# Patient Record
Sex: FEMALE | Race: WHITE | Employment: FULL TIME | URBAN - NONMETROPOLITAN AREA
[De-identification: names, ages, dates, MRNs, and addresses within clinical notes are randomized per-mention and may not be internally consistent; named-entity substitution may affect disease eponyms.]

---

## 2017-10-02 ENCOUNTER — HOSPITAL ENCOUNTER (EMERGENCY)
Age: 52
Discharge: HOME | End: 2017-10-02
Payer: COMMERCIAL

## 2017-10-02 VITALS
DIASTOLIC BLOOD PRESSURE: 75 MMHG | OXYGEN SATURATION: 100 % | RESPIRATION RATE: 16 BRPM | SYSTOLIC BLOOD PRESSURE: 147 MMHG | HEART RATE: 95 BPM | WEIGHT: 138 LBS | BODY MASS INDEX: 19.8 KG/M2 | TEMPERATURE: 98.8 F

## 2017-10-02 DIAGNOSIS — H65.01 RIGHT ACUTE SEROUS OTITIS MEDIA, RECURRENCE NOT SPECIFIED: Primary | ICD-10-CM

## 2017-10-02 DIAGNOSIS — J20.9 ACUTE BRONCHITIS, UNSPECIFIED ORGANISM: ICD-10-CM

## 2017-10-02 DIAGNOSIS — H61.22 IMPACTED CERUMEN OF LEFT EAR: ICD-10-CM

## 2017-10-02 PROCEDURE — 99213 OFFICE O/P EST LOW 20 MIN: CPT | Performed by: NURSE PRACTITIONER

## 2017-10-02 PROCEDURE — 99212 OFFICE O/P EST SF 10 MIN: CPT

## 2017-10-02 RX ORDER — ALBUTEROL SULFATE 90 UG/1
2 AEROSOL, METERED RESPIRATORY (INHALATION) 2 TIMES DAILY
Qty: 1 INHALER | Refills: 0 | Status: SHIPPED | OUTPATIENT
Start: 2017-10-02 | End: 2018-05-04 | Stop reason: ALTCHOICE

## 2017-10-02 RX ORDER — CEFDINIR 300 MG/1
300 CAPSULE ORAL 2 TIMES DAILY
Qty: 20 CAPSULE | Refills: 0 | Status: SHIPPED | OUTPATIENT
Start: 2017-10-02 | End: 2017-10-12

## 2017-10-02 RX ORDER — BIOTIN 10000 MCG
1 CAPSULE ORAL DAILY
COMMUNITY
End: 2019-01-15 | Stop reason: DRUGHIGH

## 2017-10-02 RX ORDER — METHYLPREDNISOLONE 4 MG/1
TABLET ORAL
Qty: 1 KIT | Refills: 0 | Status: SHIPPED | OUTPATIENT
Start: 2017-10-02 | End: 2017-10-08

## 2017-10-02 RX ORDER — BROMPHENIRAMINE MALEATE, PSEUDOEPHEDRINE HYDROCHLORIDE, AND DEXTROMETHORPHAN HYDROBROMIDE 2; 30; 10 MG/5ML; MG/5ML; MG/5ML
5 SYRUP ORAL 3 TIMES DAILY PRN
Qty: 60 ML | Refills: 0 | Status: SHIPPED | OUTPATIENT
Start: 2017-10-02 | End: 2017-10-07

## 2017-10-02 RX ORDER — OMEGA-3/DHA/EPA/FISH OIL 1000 MG
1 CAPSULE ORAL DAILY
Qty: 30 CAPSULE | Refills: 0 | Status: SHIPPED | OUTPATIENT
Start: 2017-10-02 | End: 2017-11-01

## 2017-10-02 ASSESSMENT — ENCOUNTER SYMPTOMS
VOICE CHANGE: 1
RHINORRHEA: 1
ABDOMINAL DISTENTION: 0
SORE THROAT: 0
CONSTIPATION: 0
DIARRHEA: 1
WHEEZING: 0
ABDOMINAL PAIN: 0
SHORTNESS OF BREATH: 0
VOMITING: 0
APNEA: 0
FACIAL SWELLING: 0
SINUS PRESSURE: 1
TROUBLE SWALLOWING: 0
CHOKING: 0
NAUSEA: 0
ANAL BLEEDING: 0
HOARSE VOICE: 0
CHEST TIGHTNESS: 0
BLOOD IN STOOL: 0
SWOLLEN GLANDS: 0
SNORING: 0
RECTAL PAIN: 0
COUGH: 1

## 2017-10-02 NOTE — ED AVS SNAPSHOT
After Visit Summary  (Discharge Instructions)    Medication List for Home    Based on the information you provided to us as well as any changes during this visit, the following is your updated medication list.  Compare this with your prescription bottles at home. If you have any questions or concerns, contact your primary care physician's office. Daily Medication List (This medication list can be shared with any Healthcare provider who is helping you manage your medications)      There are NEW medications for you. START taking them after you leave the hospital     albuterol sulfate  (90 Base) MCG/ACT inhaler   Inhale 2 puffs into the lungs 2 times daily for 5 days       brompheniramine-pseudoephedrine-DM 30-2-10 MG/5ML syrup   Take 5 mLs by mouth 3 times daily as needed for Congestion or Cough       cefdinir 300 MG capsule   Commonly known as:  OMNICEF   Take 1 capsule by mouth 2 times daily for 10 days       methylPREDNISolone 4 MG tablet   Commonly known as:  MEDROL (DORI)   Take by mouth.        PROBIOTIC ACIDOPHILUS BIOBEADS Caps   Take 1 capsule by mouth daily         These are medications you told us you were taking at home, CONTINUE taking them after you leave the hospital     aspirin 81 MG tablet   Take 81 mg by mouth daily       B COMPLEX-B12 PO   Take by mouth       calcium carbonate 500 MG Tabs tablet   Commonly known as:  OSCAL   Take 500 mg by mouth daily       ibuprofen 200 MG tablet   Commonly known as:  ADVIL;MOTRIN   Take 400 mg by mouth every 6 hours as needed for Pain       therapeutic multivitamin-minerals tablet   Take 1 tablet by mouth daily       vitamin C 500 MG tablet   Take 500 mg by mouth daily         ASK your doctor about these medications if you have questions     Biotin 10 MG Caps   Take 1 tablet by mouth daily       PROBIOTIC ACIDOPHILUS PO   Take 1 capsule by mouth daily            Where to Get Your Medications These medications were sent to [facility], [address] [facility]  [address] [facility]     Phone:  [phone]     albuterol sulfate  (90 Base) MCG/ACT inhaler    brompheniramine-pseudoephedrine-DM 30-2-10 MG/5ML syrup    cefdinir 300 MG capsule    methylPREDNISolone 4 MG tablet    PROBIOTIC ACIDOPHILUS BIOBEADS Caps               Allergies as of 10/2/2017        Reactions    Darvon [Propoxyphene] Itching      Immunizations as of 10/2/2017     Name Date Dose VIS Date Route    Influenza, Quadv, 3 yrs and older, IM, Preservative Free 10/3/2016 -- -- --    External: Patient reported         After Visit Summary    This summary was created for you. Thank you for entrusting your care to us. The following information includes details about your hospital/visit stay along with steps you should take to help with your recovery once you leave the hospital.  In this packet, you will find information about the topics listed below:    · Instructions about your medications including a list of your home medications  · A summary of your hospital visit  · Follow-up appointments once you have left the hospital  · Your care plan at home      You may receive a survey regarding the care you received during your stay. Your input is valuable to us. We encourage you to complete and return your survey in the envelope provided. We hope you will choose us in the future for your healthcare needs. Patient Information     Patient Name KELVIN Alberto 1965      Care Provided at:     Name Address Phone       [address] [phone]            Your Visit    Here you will find information about your visit, including the reason for your visit.   Please take this sheet with you when you visit your doctor or guidelines. However these guidelines can be individualized by your provider. 1/21/2018    Pap Smear 1/26/2019    Colonoscopy 9/27/2023                 Care Plan Once You Return Home    This section includes instructions you will need to follow once you leave the hospital.  Your care team will discuss these with you, so you and those caring for you know how to best care for your health needs at home. This section may also include educational information about certain health topics that may be of help to you. Important Information if you smoke or are exposed to smoking       SMOKING: QUIT SMOKING. THIS IS THE MOST IMPORTANT ACTION YOU CAN TAKE TO IMPROVE YOUR CURRENT AND FUTURE HEALTH. Call the [facility] [place] NOW ([phone])    Smoking harms nonsmokers. When nonsmokers are around people who smoke, they absorb nicotine, carbon monoxide, and other ingredients of tobacco smoke. DO NOT SMOKE AROUND CHILDREN     Children exposed to secondhand smoke are at an increased risk of:  Sudden Infant Death Syndrome (SIDS), acute respiratory infections, inflammation of the middle ear, and severe asthma. Over a longer time, it causes heart disease and lung cancer. There is no safe level of exposure to secondhand smoke. [employer] Signup     [employer] allows you to send messages to your doctor, view your test results, renew your prescriptions, schedule appointments, view visit notes, and more. How Do I Sign Up? 1. In your Internet browser, go to [email or web address]  2. Click on the Sign Up Now link in the Sign In box. You will see the New Member Sign Up page. 3. Enter your [employer] Access Code exactly as it appears below. You will not need to use this code after youve completed the sign-up process. If you do not sign up before the expiration date, you must request a new code.   [employer] Access Code: [email or web address]. Enter A923 in the [facility] box to learn more about \"Middle Ear Fluid: Care Instructions. \"     If you do not have an account, please click on the \"Sign Up Now\" link. Current as of: July 29, 2016  Content Version: 11.3  © 8546-2550 [employer]. Care instructions adapted under license by [facility] ([facility]). If you have questions about a medical condition or this instruction, always ask your healthcare professional. [address] any warranty or liability for your use of this information. More Information           Saline Nasal Washes: Care Instructions  Your Care Instructions  Saline nasal washes help keep the nasal passages open by washing out thick or dried mucus. This simple remedy can help relieve symptoms of allergies, sinusitis, and colds. It also can make the nose feel more comfortable by keeping the mucous membranes moist. You may notice a little burning sensation in your nose the first few times you use the solution, but this usually gets better in a few days. Follow-up care is a key part of your treatment and safety. Be sure to make and go to all appointments, and call your doctor if you are having problems. It's also a good idea to know your test results and keep a list of the medicines you take. How can you care for yourself at home? · You can buy premixed saline solution in a squeeze bottle or other sinus rinse products at a drugstore. Read and follow the instructions on the label. · You also can make your own saline solution by adding 1 teaspoon of salt and 1 teaspoon of baking soda to 2 cups of distilled water. · If you use a homemade solution, pour a small amount into a clean bowl. Using a rubber bulb syringe, squeeze the syringe and place the tip in the salt water. Pull a small amount of the salt water into the syringe by relaxing your hand. · Sit down with your head tilted slightly back. Do not lie down.  Put the

## 2017-10-02 NOTE — ED NOTES
Discharge instructions reviewed with patient. Patient informed to go to ER for worsening symptoms, SOB, chest pain or abdominal pain and follow up with PCP. Patient ambulatory out in stable condition.       Augustine Haywood RN  10/02/17 2791

## 2017-10-02 NOTE — ED PROVIDER NOTES
DANIE Urbano MYESHA Jacobsen 89  Urgent Care Encounter      CHIEF COMPLAINT       Chief Complaint   Patient presents with    Cough    Head Congestion     with some nasal congestion       Nurses Notes reviewed and I agree except as noted in the HPI. HISTORY OF PRESENT ILLNESS   Ozzie Walsh is a 45 y.o. Patient is a 45 y.o. female presenting with sinusitis. The history is provided by the patient. Sinusitis   Pain details:     Location:  Frontal and maxillary    Quality:  Pressure    Severity:  Moderate    Duration:  3 days    Timing:  Constant  Progression:  Worsening  Chronicity:  Recurrent  Context: allergies    Context: not chemical odor, not deviated nasal septum, not recent URI and not smoke inhalation    Relieved by:  Nothing  Worsened by:  Lying down  Ineffective treatments:  Oral decongestants and spray decongestants  Associated symptoms: chest pain, chills, congestion, cough, fatigue, headaches, rhinorrhea and sneezing    Associated symptoms: no ear pain, no fever, no hoarse voice, no mouth breathing, no nausea, no shortness of breath, no snoring, no sore throat, no swollen glands, no tooth pain, no vertigo, no vomiting and no wheezing    Risk factors: no allergic reaction, no asthma, no BiPAP, no COPD, no CPAP use, no cystic fibrosis, no diabetes, no immune deficiency, no nasal cannula, no nasal polyps and no smoke exposure        REVIEW OF SYSTEMS     Review of Systems   Constitutional: Positive for appetite change, chills and fatigue. Negative for diaphoresis and fever. HENT: Positive for congestion, postnasal drip, rhinorrhea, sinus pressure, sneezing and voice change. Negative for ear pain, facial swelling, hearing loss, hoarse voice, mouth sores, nosebleeds, sore throat, tinnitus and trouble swallowing. Respiratory: Positive for cough. Negative for apnea, snoring, choking, chest tightness, shortness of breath and wheezing. Cardiovascular: Positive for chest pain.  Negative for Head: Normocephalic and atraumatic. Right Ear: There is swelling and tenderness. Tympanic membrane is erythematous. Left Ear: A foreign body (cerumen obstructs TM) is present. Nose: Mucosal edema and rhinorrhea present. Right sinus exhibits maxillary sinus tenderness and frontal sinus tenderness. Left sinus exhibits maxillary sinus tenderness and frontal sinus tenderness. Mouth/Throat: Uvula is midline and mucous membranes are normal. Posterior oropharyngeal edema and posterior oropharyngeal erythema present. Pulmonary/Chest: She has decreased breath sounds in the right lower field and the left lower field. She has wheezes in the left lower field. She has no rhonchi. She has no rales. Neurological: She is alert. Skin: She is not diaphoretic. Nursing note and vitals reviewed. DIAGNOSTIC RESULTS   Labs:No results found for this visit on 10/02/17. IMAGING:  No orders to display     URGENT CARE COURSE:     Vitals:    10/02/17 0902   BP: (!) 147/75   Pulse: 95   Resp: 16   Temp: 98.8 °F (37.1 °C)   TempSrc: Oral   SpO2: 100%   Weight: 138 lb (62.6 kg)       Medications - No data to display  PROCEDURES:  None  FINAL IMPRESSION      1. Right acute serous otitis media, recurrence not specified    2. Impacted cerumen of left ear    3. Acute bronchitis, unspecified organism        DISPOSITION/PLAN   Decision To Discharge      The parent or patient representative was advised that at this point the patient can be treated safely at home, the parent or Patient representative should be aware of following interventions and  advised to the watch for the following:  #1. Any increasing pain not controlled with Motrin or Tylenol. #2. Any development of drainage from the ears redness of the auricle or posterior ear.   #3.  Her development of the any fever chills, headache or stiffness of the neck the patient needs to be reevaluated by the primary care provider, return here or go to the emergency department

## 2018-04-17 ENCOUNTER — HOSPITAL ENCOUNTER (EMERGENCY)
Age: 53
Discharge: HOME | End: 2018-04-17
Payer: COMMERCIAL

## 2018-04-17 VITALS
SYSTOLIC BLOOD PRESSURE: 146 MMHG | DIASTOLIC BLOOD PRESSURE: 79 MMHG | RESPIRATION RATE: 16 BRPM | OXYGEN SATURATION: 98 % | TEMPERATURE: 98 F | BODY MASS INDEX: 21.24 KG/M2 | WEIGHT: 148 LBS | HEART RATE: 76 BPM

## 2018-04-17 DIAGNOSIS — J01.00 ACUTE NON-RECURRENT MAXILLARY SINUSITIS: Primary | ICD-10-CM

## 2018-04-17 PROCEDURE — 99214 OFFICE O/P EST MOD 30 MIN: CPT | Performed by: NURSE PRACTITIONER

## 2018-04-17 PROCEDURE — 99213 OFFICE O/P EST LOW 20 MIN: CPT

## 2018-04-17 RX ORDER — AMOXICILLIN AND CLAVULANATE POTASSIUM 875; 125 MG/1; MG/1
1 TABLET, FILM COATED ORAL 2 TIMES DAILY
Qty: 14 TABLET | Refills: 0 | Status: SHIPPED | OUTPATIENT
Start: 2018-04-17 | End: 2018-04-24

## 2018-04-17 RX ORDER — LORATADINE AND PSEUDOEPHEDRINE 10; 240 MG/1; MG/1
1 TABLET, EXTENDED RELEASE ORAL DAILY
Refills: 0 | COMMUNITY
Start: 2018-04-17

## 2018-04-17 ASSESSMENT — PAIN DESCRIPTION - LOCATION: LOCATION: CHEST;BACK

## 2018-04-17 ASSESSMENT — ENCOUNTER SYMPTOMS
HOARSE VOICE: 0
NAUSEA: 0
SWOLLEN GLANDS: 0
RHINORRHEA: 0
TROUBLE SWALLOWING: 0
STRIDOR: 0
SNORING: 0
CHEST TIGHTNESS: 0
SHORTNESS OF BREATH: 0
WHEEZING: 0
VOICE CHANGE: 0
SORE THROAT: 0
COUGH: 1
FACIAL SWELLING: 0
SINUS PRESSURE: 1
VOMITING: 0

## 2018-04-17 ASSESSMENT — PAIN DESCRIPTION - PAIN TYPE: TYPE: ACUTE PAIN

## 2018-04-17 ASSESSMENT — ACTIVITIES OF DAILY LIVING (ADL): EFFECT OF PAIN ON DAILY ACTIVITIES: WITH COUGHING

## 2018-04-17 ASSESSMENT — PAIN DESCRIPTION - FREQUENCY: FREQUENCY: INTERMITTENT

## 2018-04-17 ASSESSMENT — PAIN DESCRIPTION - DESCRIPTORS: DESCRIPTORS: ACHING

## 2018-04-17 ASSESSMENT — PAIN SCALES - GENERAL: PAINLEVEL_OUTOF10: 6

## 2018-05-04 ENCOUNTER — HOSPITAL ENCOUNTER (EMERGENCY)
Age: 53
Discharge: HOME | End: 2018-05-04
Payer: COMMERCIAL

## 2018-05-04 VITALS
HEART RATE: 79 BPM | TEMPERATURE: 98.7 F | RESPIRATION RATE: 18 BRPM | DIASTOLIC BLOOD PRESSURE: 78 MMHG | BODY MASS INDEX: 20.92 KG/M2 | OXYGEN SATURATION: 97 % | WEIGHT: 145.8 LBS | SYSTOLIC BLOOD PRESSURE: 162 MMHG

## 2018-05-04 DIAGNOSIS — J32.9 CHRONIC SINUSITIS, UNSPECIFIED LOCATION: Primary | ICD-10-CM

## 2018-05-04 LAB
GROUP A STREP CULTURE, REFLEX: NEGATIVE
REFLEX THROAT C + S: NORMAL

## 2018-05-04 PROCEDURE — 87070 CULTURE OTHR SPECIMN AEROBIC: CPT

## 2018-05-04 PROCEDURE — 99215 OFFICE O/P EST HI 40 MIN: CPT

## 2018-05-04 PROCEDURE — 99213 OFFICE O/P EST LOW 20 MIN: CPT | Performed by: NURSE PRACTITIONER

## 2018-05-04 RX ORDER — SULFAMETHOXAZOLE AND TRIMETHOPRIM 800; 160 MG/1; MG/1
1 TABLET ORAL 2 TIMES DAILY
Qty: 20 TABLET | Refills: 0 | Status: SHIPPED | OUTPATIENT
Start: 2018-05-04 | End: 2018-05-14

## 2018-05-04 RX ORDER — FLUCONAZOLE 150 MG/1
150 TABLET ORAL ONCE
Qty: 1 TABLET | Refills: 0 | Status: SHIPPED | OUTPATIENT
Start: 2018-05-04 | End: 2018-05-04

## 2018-05-04 ASSESSMENT — ENCOUNTER SYMPTOMS
COUGH: 1
DIARRHEA: 0
SINUS PAIN: 1
SHORTNESS OF BREATH: 0
SORE THROAT: 1
CHEST TIGHTNESS: 0
VOMITING: 0
ABDOMINAL PAIN: 0
SINUS PRESSURE: 1
RHINORRHEA: 0
NAUSEA: 0

## 2018-05-04 ASSESSMENT — PAIN DESCRIPTION - DESCRIPTORS: DESCRIPTORS: ACHING

## 2018-05-04 ASSESSMENT — PAIN SCALES - GENERAL: PAINLEVEL_OUTOF10: 7

## 2018-05-04 ASSESSMENT — PAIN DESCRIPTION - PAIN TYPE: TYPE: ACUTE PAIN

## 2018-05-04 ASSESSMENT — PAIN DESCRIPTION - LOCATION: LOCATION: THROAT

## 2018-05-06 LAB — THROAT/NOSE CULTURE: NORMAL

## 2018-06-22 ENCOUNTER — HOSPITAL ENCOUNTER (EMERGENCY)
Age: 53
Discharge: HOME | End: 2018-06-22
Payer: COMMERCIAL

## 2018-06-22 VITALS
TEMPERATURE: 97.8 F | BODY MASS INDEX: 20.9 KG/M2 | HEART RATE: 86 BPM | OXYGEN SATURATION: 100 % | RESPIRATION RATE: 16 BRPM | DIASTOLIC BLOOD PRESSURE: 83 MMHG | WEIGHT: 146 LBS | HEIGHT: 70 IN | SYSTOLIC BLOOD PRESSURE: 150 MMHG

## 2018-06-22 DIAGNOSIS — N30.01 ACUTE CYSTITIS WITH HEMATURIA: Primary | ICD-10-CM

## 2018-06-22 LAB
BILIRUBIN URINE: NEGATIVE
BLOOD, URINE: ABNORMAL
CHARACTER, URINE: CLEAR
COLOR: YELLOW
GLUCOSE, URINE: NEGATIVE MG/DL
KETONES, URINE: NEGATIVE
LEUKOCYTES, UA: ABNORMAL
NITRATE, UA: NEGATIVE
PH UA: 7.5 (ref 5–9)
PROTEIN UA: NEGATIVE MG/DL
REFLEX TO URINE C & S: ABNORMAL
SPECIFIC GRAVITY UA: 1.01 (ref 1–1.03)
UROBILINOGEN, URINE: 0.2 EU/DL (ref 0–1)

## 2018-06-22 PROCEDURE — 87086 URINE CULTURE/COLONY COUNT: CPT

## 2018-06-22 PROCEDURE — 99213 OFFICE O/P EST LOW 20 MIN: CPT | Performed by: NURSE PRACTITIONER

## 2018-06-22 PROCEDURE — 81003 URINALYSIS AUTO W/O SCOPE: CPT

## 2018-06-22 PROCEDURE — 99213 OFFICE O/P EST LOW 20 MIN: CPT

## 2018-06-22 RX ORDER — NITROFURANTOIN 25; 75 MG/1; MG/1
100 CAPSULE ORAL 2 TIMES DAILY
Qty: 10 CAPSULE | Refills: 0 | Status: SHIPPED | OUTPATIENT
Start: 2018-06-22 | End: 2018-06-27

## 2018-06-22 RX ORDER — PHENAZOPYRIDINE HYDROCHLORIDE 200 MG/1
200 TABLET, FILM COATED ORAL 3 TIMES DAILY PRN
Qty: 9 TABLET | Refills: 0 | Status: SHIPPED | OUTPATIENT
Start: 2018-06-22 | End: 2018-06-25

## 2018-06-22 ASSESSMENT — ENCOUNTER SYMPTOMS
DIARRHEA: 0
ABDOMINAL PAIN: 0
NAUSEA: 0
SHORTNESS OF BREATH: 0
COUGH: 0
WHEEZING: 0
CONSTIPATION: 0
SORE THROAT: 0

## 2018-06-22 ASSESSMENT — PAIN SCALES - GENERAL: PAINLEVEL_OUTOF10: 8

## 2018-06-22 ASSESSMENT — PAIN DESCRIPTION - ORIENTATION: ORIENTATION: LOWER

## 2018-06-22 ASSESSMENT — PAIN DESCRIPTION - DESCRIPTORS: DESCRIPTORS: ACHING

## 2018-06-22 ASSESSMENT — PAIN DESCRIPTION - PAIN TYPE: TYPE: ACUTE PAIN

## 2018-06-22 ASSESSMENT — PAIN DESCRIPTION - LOCATION: LOCATION: ABDOMEN

## 2018-06-24 LAB
ORGANISM: ABNORMAL
URINE CULTURE REFLEX: ABNORMAL

## 2019-01-10 ENCOUNTER — TELEPHONE (OUTPATIENT)
Dept: FAMILY MEDICINE CLINIC | Age: 54
End: 2019-01-10

## 2019-01-15 ENCOUNTER — OFFICE VISIT (OUTPATIENT)
Dept: FAMILY MEDICINE CLINIC | Age: 54
End: 2019-01-15
Payer: COMMERCIAL

## 2019-01-15 VITALS
TEMPERATURE: 98.3 F | HEIGHT: 70 IN | RESPIRATION RATE: 16 BRPM | DIASTOLIC BLOOD PRESSURE: 84 MMHG | HEART RATE: 95 BPM | WEIGHT: 148 LBS | BODY MASS INDEX: 21.19 KG/M2 | SYSTOLIC BLOOD PRESSURE: 138 MMHG

## 2019-01-15 DIAGNOSIS — Z00.00 WELL ADULT HEALTH CHECK: Primary | ICD-10-CM

## 2019-01-15 PROCEDURE — G8484 FLU IMMUNIZE NO ADMIN: HCPCS | Performed by: NURSE PRACTITIONER

## 2019-01-15 PROCEDURE — 99386 PREV VISIT NEW AGE 40-64: CPT | Performed by: NURSE PRACTITIONER

## 2019-01-15 RX ORDER — GLUCOSAM/CHON-MSM1/C/MANG/BOSW 750-644 MG
TABLET ORAL
COMMUNITY

## 2019-01-15 RX ORDER — BIOTIN 10000 MCG
CAPSULE ORAL
COMMUNITY

## 2019-01-15 ASSESSMENT — PATIENT HEALTH QUESTIONNAIRE - PHQ9
2. FEELING DOWN, DEPRESSED OR HOPELESS: 0
SUM OF ALL RESPONSES TO PHQ QUESTIONS 1-9: 0
1. LITTLE INTEREST OR PLEASURE IN DOING THINGS: 0
SUM OF ALL RESPONSES TO PHQ9 QUESTIONS 1 & 2: 0
SUM OF ALL RESPONSES TO PHQ QUESTIONS 1-9: 0

## 2019-02-13 ENCOUNTER — TELEPHONE (OUTPATIENT)
Dept: FAMILY MEDICINE CLINIC | Age: 54
End: 2019-02-13

## 2019-02-13 LAB
ABSOLUTE BASO #: 0 K/UL (ref 0–0.1)
ABSOLUTE EOS #: 0.1 K/UL (ref 0.1–0.4)
ABSOLUTE LYMPH #: 1.1 K/UL (ref 0.8–5.2)
ABSOLUTE MONO #: 0.4 K/UL (ref 0.1–0.9)
ABSOLUTE NEUT #: 3 K/UL (ref 1.3–9.1)
ALBUMIN SERPL-MCNC: 4.7 G/DL (ref 3.5–5.2)
ALK PHOSPHATASE: 85 U/L (ref 30–111)
ALT SERPL-CCNC: 17 U/L (ref 5–40)
ANION GAP SERPL CALCULATED.3IONS-SCNC: 10 MEQ/L (ref 10–19)
AST SERPL-CCNC: 21 U/L (ref 9–40)
BASOPHILS RELATIVE PERCENT: 0.9 %
BILIRUB SERPL-MCNC: 0.5 MG/DL
BUN BLDV-MCNC: 11 MG/DL (ref 8–23)
CALCIUM SERPL-MCNC: 9.7 MG/DL (ref 8.5–10.5)
CHLORIDE BLD-SCNC: 103 MEQ/L (ref 95–107)
CHOLESTEROL/HDL RATIO: 5.2
CHOLESTEROL: 224 MG/DL
CO2: 30 MEQ/L (ref 19–31)
CREAT SERPL-MCNC: 0.8 MG/DL (ref 0.6–1.3)
EGFR AFRICAN AMERICAN: 95.5 ML/MIN/1.73 M2
EGFR IF NONAFRICAN AMERICAN: 82.4 ML/MIN/1.73 M2
EOSINOPHILS RELATIVE PERCENT: 1.5 %
GLUCOSE: 80 MG/DL (ref 70–99)
HCT VFR BLD CALC: 38.7 % (ref 36–48)
HDLC SERPL-MCNC: 42.9 MG/DL
HEMOGLOBIN: 13.5 G/DL (ref 12–16)
LDL CHOLESTEROL CALCULATED: 138 MG/DL
LDL/HDL RATIO: 3.2
LYMPHOCYTE %: 24.3 %
MCH RBC QN AUTO: 30.1 PG (ref 27–34)
MCHC RBC AUTO-ENTMCNC: 34.9 G/DL (ref 31–36)
MCV RBC AUTO: 86.4 FL (ref 80–100)
MONOCYTES # BLD: 8.4 %
NEUTROPHILS RELATIVE PERCENT: 64.7 %
PDW BLD-RTO: 13.2 % (ref 10.8–14.8)
PLATELETS: 252 K/UL (ref 150–450)
POTASSIUM SERPL-SCNC: 4.8 MEQ/L (ref 3.5–5.4)
RBC: 4.48 M/UL (ref 4–5.5)
SODIUM BLD-SCNC: 143 MEQ/L (ref 135–146)
TOTAL PROTEIN: 7.1 G/DL (ref 6.1–8.3)
TRIGL SERPL-MCNC: 217 MG/DL
TSH SERPL DL<=0.05 MIU/L-ACNC: 2.51 UIU/ML (ref 0.4–4.1)
VLDLC SERPL CALC-MCNC: 43 MG/DL
WBC: 4.7 K/UL (ref 3.7–10.8)

## 2019-02-14 LAB
CHOLESTEROL, TOTAL: 224 MG/DL
CHOLESTEROL/HDL RATIO: 5.2
HDLC SERPL-MCNC: 43 MG/DL (ref 35–70)
LDL CHOLESTEROL CALCULATED: 138 MG/DL (ref 0–160)
TRIGL SERPL-MCNC: 217 MG/DL
VLDLC SERPL CALC-MCNC: 43 MG/DL

## 2019-02-28 ENCOUNTER — OFFICE VISIT (OUTPATIENT)
Dept: FAMILY MEDICINE CLINIC | Age: 54
End: 2019-02-28
Payer: COMMERCIAL

## 2019-02-28 VITALS
SYSTOLIC BLOOD PRESSURE: 118 MMHG | DIASTOLIC BLOOD PRESSURE: 78 MMHG | WEIGHT: 149 LBS | RESPIRATION RATE: 12 BRPM | HEIGHT: 70 IN | HEART RATE: 88 BPM | BODY MASS INDEX: 21.33 KG/M2 | TEMPERATURE: 98.5 F

## 2019-02-28 DIAGNOSIS — R07.0 BURNING SENSATION OF THROAT: ICD-10-CM

## 2019-02-28 DIAGNOSIS — K21.9 GASTROESOPHAGEAL REFLUX DISEASE, ESOPHAGITIS PRESENCE NOT SPECIFIED: Primary | ICD-10-CM

## 2019-02-28 PROCEDURE — G8484 FLU IMMUNIZE NO ADMIN: HCPCS | Performed by: NURSE PRACTITIONER

## 2019-02-28 PROCEDURE — 3017F COLORECTAL CA SCREEN DOC REV: CPT | Performed by: NURSE PRACTITIONER

## 2019-02-28 PROCEDURE — G8427 DOCREV CUR MEDS BY ELIG CLIN: HCPCS | Performed by: NURSE PRACTITIONER

## 2019-02-28 PROCEDURE — 99213 OFFICE O/P EST LOW 20 MIN: CPT | Performed by: NURSE PRACTITIONER

## 2019-02-28 PROCEDURE — G8420 CALC BMI NORM PARAMETERS: HCPCS | Performed by: NURSE PRACTITIONER

## 2019-02-28 PROCEDURE — 1036F TOBACCO NON-USER: CPT | Performed by: NURSE PRACTITIONER

## 2019-02-28 RX ORDER — OMEPRAZOLE 20 MG/1
20 CAPSULE, DELAYED RELEASE ORAL
Qty: 30 CAPSULE | Refills: 5 | Status: SHIPPED | OUTPATIENT
Start: 2019-02-28

## 2019-02-28 ASSESSMENT — ENCOUNTER SYMPTOMS
NAUSEA: 0
ABDOMINAL PAIN: 0
ABDOMINAL DISTENTION: 0
VOMITING: 0
BLOOD IN STOOL: 0
RESPIRATORY NEGATIVE: 1
CONSTIPATION: 0
DIARRHEA: 0

## 2019-06-19 ENCOUNTER — OFFICE VISIT (OUTPATIENT)
Dept: FAMILY MEDICINE CLINIC | Age: 54
End: 2019-06-19
Payer: COMMERCIAL

## 2019-06-19 ENCOUNTER — TELEPHONE (OUTPATIENT)
Dept: FAMILY MEDICINE CLINIC | Age: 54
End: 2019-06-19

## 2019-06-19 VITALS
WEIGHT: 140 LBS | TEMPERATURE: 98.4 F | HEIGHT: 70 IN | SYSTOLIC BLOOD PRESSURE: 126 MMHG | DIASTOLIC BLOOD PRESSURE: 82 MMHG | RESPIRATION RATE: 16 BRPM | HEART RATE: 88 BPM | BODY MASS INDEX: 20.04 KG/M2

## 2019-06-19 DIAGNOSIS — Z23 NEED FOR TDAP VACCINATION: ICD-10-CM

## 2019-06-19 DIAGNOSIS — Z02.1 PHYSICAL EXAM, PRE-EMPLOYMENT: Primary | ICD-10-CM

## 2019-06-19 PROCEDURE — 99396 PREV VISIT EST AGE 40-64: CPT | Performed by: NURSE PRACTITIONER

## 2019-06-19 PROCEDURE — 90471 IMMUNIZATION ADMIN: CPT | Performed by: NURSE PRACTITIONER

## 2019-06-19 PROCEDURE — 90715 TDAP VACCINE 7 YRS/> IM: CPT | Performed by: NURSE PRACTITIONER

## 2019-06-19 PROCEDURE — 86580 TB INTRADERMAL TEST: CPT | Performed by: NURSE PRACTITIONER

## 2019-06-19 NOTE — PROGRESS NOTES
Chief Complaint   Patient presents with    Employment Physical     needs TB         SUBJECTIVE:  Jameson Ruff is a 62 y.o. female for physical exam for pre-employment    Diet - regular  Exercise - yes  Sleep - fine    [place] [facility] [place] and is going to be starting a new job for the [employer] Maintenance reviewed - patient asked to schedule her pap smear.      Past Medical History:   Diagnosis Date    Sinusitis        Past Surgical History:   Procedure Laterality Date    HYSTERECTOMY      OTHER SURGICAL HISTORY      vaginal fissure repair    SKIN CANCER EXCISION      right thigh       Social History     Socioeconomic History    Marital status:      Spouse name: Not on file    Number of children: Not on file    Years of education: Not on file    Highest education level: Not on file   Occupational History    Not on file   Social Needs    Financial resource strain: Not on file    Food insecurity:     Worry: Not on file     Inability: Not on file    Transportation needs:     Medical: Not on file     Non-medical: Not on file   Tobacco Use    Smoking status: Never Smoker    Smokeless tobacco: Never Used   Substance and Sexual Activity    Alcohol use: No    Drug use: No    Sexual activity: Not on file   Lifestyle    Physical activity:     Days per week: Not on file     Minutes per session: Not on file    Stress: Not on file   Relationships    Social connections:     Talks on phone: Not on file     Gets together: Not on file     Attends Evangelical service: Not on file     Active member of club or organization: Not on file     Attends meetings of clubs or organizations: Not on file     Relationship status: Not on file    Intimate partner violence:     Fear of current or ex partner: Not on file     Emotionally abused: Not on file     Physically abused: Not on file     Forced sexual activity: Not on file   Other Topics Concern    Not on file   Social History Narrative    Not on file       Family History   Problem Relation Age of Onset    Diabetes Mother     Other Mother     Breast Cancer Mother     Diabetes Father     Other Father         heart disease           I have reviewed the patient's past medical history, past surgical history, allergies, medications, social and family history and I have made updates where appropriate.     Review of Systems  Positive responses are highlighted in bold    Constitutional:  Fever, Chills, Fatigue, Unexpected changes in weight  Eyes:  Eye discharge, Eye pain, Eye redness, Visual disturbances   HENT:  Ear pain, Tinnitus, Nosebleeds, Trouble swallowing  Cardiovascular:  Chest Pain, Palpitations  Respiratory:  Cough, Wheezing, Shortness of breath, Chest tightness, Apnea  Gastrointestinal:  Nausea, Vomiting, Diarrhea, Constipation, Heartburn, Blood in stool  Genitourinary:  Difficulty or painful urination, Flank pain, Change in frequency, Urgency  Skin:  Color change, Rash, Itching, Wound  Psychiatric:  Hallucinations, Anxiety, Depression, Suicidal ideation  Hematological:  Enlarged glands, Easy bleeding, Easily bruising  Musculoskeletal:  Joint pain, Back pain, Gait problems, Joint swelling, Myalgias  Neurological:  Dizziness, Headaches, Presyncope, Numbness, Seizures, Tremors  Allergy:  Environmental allergies, Food allergies  Endocrine:  Heat Intolerance, Cold Intolerance, Polydipsia, Polyphagia, Polyuria      PHYSICAL EXAM:  Vitals:    06/19/19 1343   BP: 126/82   Pulse:    Resp:    Temp:      General Appearance: well developed and well- nourished, in no acute distress  Head: normocephalic and atraumatic  Eyes: pupils equal, round, and reactive to light, extraocular eye movements intact, conjunctivae and eye lids without erythema  ENT: external ear and ear canal normal bilaterally, TMs intact and regular, nose without deformity, nasal mucosa and turbinates normal without polyps, oropharynx normal, dentition is normal for age  Neck: supple and non-tender without mass, no thyromegaly or thyroid nodules, no cervical lymphadenopathy  Pulmonary/Chest: clear to auscultation bilaterally- no wheezes, rales or rhonchi, normal air movement, no respiratory distress or retractions  Cardiovascular: normal rate, regular rhythm, normal S1 and S2, no murmurs, rubs, clicks, or gallops, distal pulses intact, no carotid bruits  Abdomen: soft, non-tender, non-distended, normal bowel sounds,  no rebound or guarding, no masses or hernias noted  Extremities: no cyanosis, clubbing or edema of the lower extremities  Musculoskeletal: No joint swelling or gross deformity   Neuro:  Alert, 5/5 strength globally and symmetrically  Skin: warm and dry, no rash or erythema  Psych:  Affect appropriate. Thought process is normal without evidence of depression or psychosis. Good insight and appropriae interaction. Cognition and memory appear to be intact. Tia Peers was seen today for employment physical.    Diagnoses and all orders for this visit:    Physical exam, pre-employment  -     Tdap (age 6y and older) IM ([address])    Need for Tdap vaccination  -     Tdap (age 6y and older) IM ([address])      - see attached form  - PPD and update Tdap    Return if symptoms worsen or fail to improve. Counseling was provided today regarding the following topics: Healthy eating habits, Regular exercise, substance abuse and healthy sleep habits.

## 2019-06-19 NOTE — PATIENT INSTRUCTIONS
Patient Education        Well Visit, Women 48 to 72: Care Instructions  Your Care Instructions    Physical exams can help you stay healthy. Your doctor has checked your overall health and may have suggested ways to take good care of yourself. He or she also may have recommended tests. At home, you can help prevent illness with healthy eating, regular exercise, and other steps. Follow-up care is a key part of your treatment and safety. Be sure to make and go to all appointments, and call your doctor if you are having problems. It's also a good idea to know your test results and keep a list of the medicines you take. How can you care for yourself at home? · Reach and stay at a healthy weight. This will lower your risk for many problems, such as obesity, diabetes, heart disease, and high blood pressure. · Get at least 30 minutes of exercise on most days of the week. Walking is a good choice. You also may want to do other activities, such as running, swimming, cycling, or playing tennis or team sports. · Do not smoke. Smoking can make health problems worse. If you need help quitting, talk to your doctor about stop-smoking programs and medicines. These can increase your chances of quitting for good. · Protect your skin from too much sun. When you're outdoors from 10 a.m. to 4 p.m., stay in the shade or cover up with clothing and a hat with a wide brim. Wear sunglasses that block UV rays. Even when it's cloudy, put broad-spectrum sunscreen (SPF 30 or higher) on any exposed skin. · See a dentist one or two times a year for checkups and to have your teeth cleaned. · Wear a seat belt in the car. Follow your doctor's advice about when to have certain tests. These tests can spot problems early. · Cholesterol. Your doctor will tell you how often to have this done based on your age, family history, or other things that can increase your risk for heart attack and stroke. · Blood pressure.  Have your blood pressure checked during a routine doctor visit. Your doctor will tell you how often to check your blood pressure based on your age, your blood pressure results, and other factors. · Mammogram. Ask your doctor how often you should have a mammogram, which is an X-ray of your breasts. A mammogram can spot breast cancer before it can be felt and when it is easiest to treat. · Pap test and pelvic exam. Ask your doctor how often you should have a Pap test. You may not need to have a Pap test as often as you used to. · Vision. Have your eyes checked every year or two or as often as your doctor suggests. Some experts recommend that you have yearly exams for glaucoma and other age-related eye problems starting at age 48. · Hearing. Tell your doctor if you notice any change in your hearing. You can have tests to find out how well you hear. · Diabetes. Ask your doctor whether you should have tests for diabetes. · Colorectal cancer. Your risk for colorectal cancer gets higher as you get older. Some experts say that adults should start regular screening at age 48 and stop at age 76. Others say to start before age 48 or continue after age 76. Talk with your doctor about your risk and when to start and stop screening. · Thyroid disease. Talk to your doctor about whether to have your thyroid checked as part of a regular physical exam. Women have an increased chance of a thyroid problem. · Osteoporosis. You should begin tests for bone density at age 72. If you are younger than 72, ask your doctor whether you have factors that may increase your risk for this disease. You may want to have this test before age 72. · Heart attack and stroke risk. At least every 4 to 6 years, you should have your risk for heart attack and stroke assessed. Your doctor uses factors such as your age, blood pressure, cholesterol, and whether you smoke or have diabetes to show what your risk for a heart attack or stroke is over the next 10 years.   When should you call for help? Watch closely for changes in your health, and be sure to contact your doctor if you have any problems or symptoms that concern you. Where can you learn more? Go to [email or web address] and sign in to your [facility] [employer] account. Enter R290 in the [employer] box to learn more about \"Well Visit, Women 50 to 72: Care Instructions. \"     If you do not have an account, please click on the \"Sign Up Now\" link. Current as of: December 13, 2018  Content Version: 12.0  © 1509-8205 [employer]. Care instructions adapted under license by [facility] ([facility]). If you have questions about a medical condition or this instruction, always ask your healthcare professional. [address] any warranty or liability for your use of this information.

## 2019-06-19 NOTE — TELEPHONE ENCOUNTER
Patient needs to have a physical and TB test for a new job. Please advise when the TB test would need to be read so an appointment can be scheduled accordingly. She can come in today if it can be read on Friday. Please advise.

## 2019-06-21 ENCOUNTER — NURSE ONLY (OUTPATIENT)
Dept: FAMILY MEDICINE CLINIC | Age: 54
End: 2019-06-21

## 2019-06-21 DIAGNOSIS — Z11.1 PPD SCREENING TEST: Primary | ICD-10-CM
